# Patient Record
Sex: MALE | Race: WHITE | Employment: FULL TIME | URBAN - METROPOLITAN AREA
[De-identification: names, ages, dates, MRNs, and addresses within clinical notes are randomized per-mention and may not be internally consistent; named-entity substitution may affect disease eponyms.]

---

## 2024-09-23 ENCOUNTER — APPOINTMENT (OUTPATIENT)
Dept: GENERAL RADIOLOGY | Facility: HOSPITAL | Age: 29
End: 2024-09-23
Attending: EMERGENCY MEDICINE

## 2024-09-23 ENCOUNTER — HOSPITAL ENCOUNTER (EMERGENCY)
Facility: HOSPITAL | Age: 29
Discharge: HOME | End: 2024-09-23
Attending: EMERGENCY MEDICINE

## 2024-09-23 VITALS
HEART RATE: 81 BPM | OXYGEN SATURATION: 98 % | DIASTOLIC BLOOD PRESSURE: 87 MMHG | SYSTOLIC BLOOD PRESSURE: 144 MMHG | BODY MASS INDEX: 30.8 KG/M2 | WEIGHT: 240 LBS | TEMPERATURE: 98 F | HEIGHT: 74 IN | RESPIRATION RATE: 18 BRPM

## 2024-09-23 DIAGNOSIS — S99.921A INJURY OF RIGHT FOOT, INITIAL ENCOUNTER: Primary | ICD-10-CM

## 2024-09-23 PROCEDURE — 73630 X-RAY EXAM OF FOOT: CPT | Performed by: EMERGENCY MEDICINE

## 2024-09-23 PROCEDURE — 99284 EMERGENCY DEPT VISIT MOD MDM: CPT

## 2024-09-23 PROCEDURE — 99283 EMERGENCY DEPT VISIT LOW MDM: CPT

## 2024-09-23 NOTE — ED INITIAL ASSESSMENT (HPI)
Pt states he was kickboxing at 12 am and hurt his right foot, heard a snap sound and thinks it broken

## 2024-09-23 NOTE — ED PROVIDER NOTES
Patient Seen in: [facility] Emergency Department      History     Chief Complaint   Patient presents with    Leg or Foot Injury     Stated Complaint: thinks he broke a few broken toes from an injury last night    Subjective:   HPI    29-year-old male presents reporting he injured his right foot kickboxing last night.  He reports pain along the lateral aspect of the right foot.  He says he felt a pop to the area when it happened.  He is having difficulty weightbearing due to pain.  He has a Ortho boot from a previous Achilles surgery and says it helps to wear the boot.  Reports some swelling to the area but denies any deformity or bruising.    Objective:   History reviewed. No pertinent past medical history.           History reviewed. No pertinent surgical history.             Social History     Socioeconomic History    Marital status: Single   Tobacco Use    Smoking status: Never     Passive exposure: Never    Smokeless tobacco: Never   Substance and Sexual Activity    Alcohol use: Never    Drug use: Never              Review of Systems    Positive for stated Chief Complaint: Leg or Foot Injury    Other systems are as noted in HPI.  Constitutional and vital signs reviewed.      All other systems reviewed and negative except as noted above.    Physical Exam     ED Triage Vitals [09/23/24 1157]   /87   Pulse 81   Resp 18   Temp 97.6 °F (36.4 °C)   Temp src Temporal   SpO2 98 %   O2 Device None (Room air)       Current Vitals:   Vital Signs  BP: 144/87  Pulse: 81  Resp: 18  Temp: 97.6 °F (36.4 °C)  Temp src: Temporal    Oxygen Therapy  SpO2: 98 %  O2 Device: None (Room air)            Physical Exam    General:  Vitals as listed.  No acute distress   Tenderness on palpation along the fifth metatarsal particularly at the base of the metatarsal.  No obvious deformity.  No ecchymosis or erythema.  Extremities: normal peripheral pulses   Neuro: Alert oriented and nonfocal   Skin: no rashes or nodules    ED  Course   Labs Reviewed - No data to display          XR FOOT, COMPLETE (MIN 3 VIEWS), RIGHT (CPT=73630)    Result Date: 9/23/2024  PROCEDURE:  XR FOOT, COMPLETE (MIN 3 VIEWS), RIGHT (CPT=73630)  TECHNIQUE:  AP, oblique, and lateral views were obtained.  COMPARISON:  None.  INDICATIONS:  thinks he broke a few broken toes from an injury last night  PATIENT STATED HISTORY: (As transcribed by Technologist)  Patient has right foot pain near the 4th/5th metatarsals. He felt a pop/snap while kickboxing last night.    FINDINGS:  No acute fracture or dislocation.  Prominent soft tissue along the plantar aspect of the foot is noted.  Tibiotalar joint space is preserved.  An osteophyte along the superior aspect of the talus is noted.            CONCLUSION:  No acute fracture or dislocation.  Soft tissue swelling along the plantar aspect of the foot is nonspecific.  Correlate clinically.   LOCATION:  [facility]   Dictated by (CST): Osmel Madsen MD on 9/23/2024 at 12:32 PM     Finalized by (CST): Osmel Madsen MD on 9/23/2024 at 12:32 PM               MDM      29-year-old male presents with an injury to the right foot.  He said he had a sudden severe pain when he was kickboxing last night and is concerned he could have a fracture.    Differential includes but is not limited to contusion, sprain/strain, fracture, dislocation, a life/function threat.    X-ray of the right foot ordered for further evaluation.    My independent interpretation of x-ray of the right foot is that there is no obvious displaced fracture.    Radiology reports no acute fracture or dislocation on x-ray.  They do note soft tissue swelling.    Discussed with patient that there is no evidence of fracture and this appears more consistent with sprain/strain or contusion.  Provided crutches and instructed to weight-bear as tolerated.  Provide orthopedic clinic information for follow-up as needed.  Discussed RICE management.  Should continue monitoring symptoms and  return with worsening or any concerns.                                       Medical Decision Making      Disposition and Plan     Clinical Impression:  1. Injury of right foot, initial encounter         Disposition:  Discharge  9/23/2024  1:12 pm    Follow-up:  Lisa Simmons MD  [address] [place] [address] [id number] [phone]    Follow up  Orthopedic specialist, As needed          Medications Prescribed:  There are no discharge medications for this patient.

## 2024-09-23 NOTE — ED QUICK NOTES
Pt reevaluated by dr templeton, pt informed of his test report and plan of care. Verbalizing understanding